# Patient Record
Sex: MALE | Race: WHITE
[De-identification: names, ages, dates, MRNs, and addresses within clinical notes are randomized per-mention and may not be internally consistent; named-entity substitution may affect disease eponyms.]

---

## 2020-07-25 ENCOUNTER — HOSPITAL ENCOUNTER (INPATIENT)
Dept: HOSPITAL 74 - JER | Age: 60
LOS: 1 days | Discharge: ANOTHER HOSPITAL | DRG: 87 | End: 2020-07-26
Attending: INTERNAL MEDICINE | Admitting: INTERNAL MEDICINE
Payer: COMMERCIAL

## 2020-07-25 VITALS — BODY MASS INDEX: 29.2 KG/M2
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
## 2020-07-25 DIAGNOSIS — S06.5X0A: Primary | ICD-10-CM

## 2020-07-25 DIAGNOSIS — E78.5: ICD-10-CM

## 2020-07-25 DIAGNOSIS — I25.2: ICD-10-CM
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
## 2020-07-25 DIAGNOSIS — I10: ICD-10-CM

## 2020-07-25 DIAGNOSIS — I25.10: ICD-10-CM

## 2020-07-25 LAB
ALBUMIN SERPL-MCNC: 4 G/DL (ref 3.4–5)
ALP SERPL-CCNC: 119 U/L (ref 45–117)
ALT SERPL-CCNC: 39 U/L (ref 13–61)
ANION GAP SERPL CALC-SCNC: 7 MMOL/L (ref 8–16)
APTT BLD: 31.9 SECONDS (ref 25.2–36.5)
AST SERPL-CCNC: 41 U/L (ref 15–37)
BASOPHILS # BLD: 0.5 % (ref 0–2)
BILIRUB SERPL-MCNC: 0.5 MG/DL (ref 0.2–1)
BUN SERPL-MCNC: 9.1 MG/DL (ref 7–18)
CALCIUM SERPL-MCNC: 9.1 MG/DL (ref 8.5–10.1)
CHLORIDE SERPL-SCNC: 101 MMOL/L (ref 98–107)
CO2 SERPL-SCNC: 25 MMOL/L (ref 21–32)
CREAT SERPL-MCNC: 0.7 MG/DL (ref 0.55–1.3)
DEPRECATED RDW RBC AUTO: 13.7 % (ref 11.9–15.9)
EOSINOPHIL # BLD: 1.2 % (ref 0–4.5)
GLUCOSE SERPL-MCNC: 105 MG/DL (ref 74–106)
HCT VFR BLD CALC: 43.7 % (ref 35.4–49)
HGB BLD-MCNC: 14.8 GM/DL (ref 11.7–16.9)
INR BLD: 0.97 (ref 0.83–1.09)
LYMPHOCYTES # BLD: 17.2 % (ref 8–40)
MCH RBC QN AUTO: 34 PG (ref 25.7–33.7)
MCHC RBC AUTO-ENTMCNC: 33.9 G/DL (ref 32–35.9)
MCV RBC: 100.3 FL (ref 80–96)
MONOCYTES # BLD AUTO: 8.2 % (ref 3.8–10.2)
NEUTROPHILS # BLD: 72.9 % (ref 42.8–82.8)
PLATELET # BLD AUTO: 211 K/MM3 (ref 134–434)
PMV BLD: 9.4 FL (ref 7.5–11.1)
POTASSIUM SERPLBLD-SCNC: 4.9 MMOL/L (ref 3.5–5.1)
PROT SERPL-MCNC: 7.7 G/DL (ref 6.4–8.2)
PT PNL PPP: 11.5 SEC (ref 9.7–13)
RBC # BLD AUTO: 4.35 M/MM3 (ref 4–5.6)
SODIUM SERPL-SCNC: 133 MMOL/L (ref 136–145)
WBC # BLD AUTO: 12.2 K/MM3 (ref 4–10)

## 2020-07-25 PROCEDURE — U0003 INFECTIOUS AGENT DETECTION BY NUCLEIC ACID (DNA OR RNA); SEVERE ACUTE RESPIRATORY SYNDROME CORONAVIRUS 2 (SARS-COV-2) (CORONAVIRUS DISEASE [COVID-19]), AMPLIFIED PROBE TECHNIQUE, MAKING USE OF HIGH THROUGHPUT TECHNOLOGIES AS DESCRIBED BY CMS-2020-01-R: HCPCS

## 2020-07-25 NOTE — PDOC
*Physical Exam





- Vital Signs


                                Last Vital Signs











Temp Pulse Resp BP Pulse Ox


 


 98.8 F   87   18   136/88   98 


 


 07/25/20 16:56  07/25/20 16:56  07/25/20 16:56  07/25/20 16:56  07/25/20 16:56














ED Treatment Course





- LABORATORY


CBC & Chemistry Diagram: 


                                 07/25/20 18:00





                                 07/25/20 18:00





- ADDITIONAL ORDERS


Additional order review: 


                               Laboratory  Results











  07/25/20 07/25/20





  18:00 18:00


 


PT with INR  11.50 


 


INR  0.97 


 


PTT (Actin FS)  31.9 


 


Sodium   133 L


 


Potassium   4.9


 


Chloride   101


 


Carbon Dioxide   25


 


Anion Gap   7 L


 


BUN   9.1


 


Creatinine   0.7


 


Est GFR (CKD-EPI)AfAm   119.72


 


Est GFR (CKD-EPI)NonAf   103.30


 


Random Glucose   105


 


Calcium   9.1


 


Total Bilirubin   0.5


 


AST   41 H


 


ALT   39


 


Alkaline Phosphatase   119 H


 


Total Protein   7.7


 


Albumin   4.0








                                        











  07/25/20





  18:00


 


RBC  4.35


 


MCV  100.3 H


 


MCHC  33.9


 


RDW  13.7


 


MPV  9.4


 


Neutrophils %  72.9


 


Lymphocytes %  17.2


 


Monocytes %  8.2


 


Eosinophils %  1.2


 


Basophils %  0.5














- Medications


Given in the ED: 


ED Medications














Discontinued Medications














Generic Name Dose Route Start Last Admin





  Trade Name Troyq  PRN Reason Stop Dose Admin


 


Diphenhydramine HCl  25 mg  07/25/20 16:57  07/25/20 18:03





  Benadryl Injection -  IVPUSH  07/25/20 16:58  Not Given





  ONCE ONE  


 


Metoclopramide HCl  10 mg  07/25/20 16:57  07/25/20 18:03





  Reglan Injection -  IVPB  07/25/20 16:58  Not Given





  ONCE ONE  


 


Sodium Chloride  1,000 ml  07/25/20 16:57  07/25/20 18:03





  Normal Saline -  IV  07/25/20 16:58  1,000 ml





  ONCE ONE   Administration














Medical Decision Making





- Medical Decision Making


Pt was signed out to me by resident Dr. Hook, who explained the 

presentation, ED course, any pending results, and needed interventions. Pt 

pending transfer to [place] (day team spoke with Dr. Mendez, Neuro ICU 

fellow). VSS and neuro exam unchanged from baseline.





Pt receiving tylenol, reglan, benadryl for headache.


Neuro ICU at [place] requesting repeat head CT for stability of bleed. Pt 

agreeable to plan. 


07/25/20 19:25





Pt taken for repeat head CT. States headache improved after interventions. VSS. 


07/25/20 21:57





CT unchanged from prior. 


Spoke with Dr. Tomlin (attending Dr. Laureano), accepted to neuro step-down 

unit. 


Pt pending transfer.


BP 150s/80s, will medicate with IV labetalol push doses.


07/25/20 22:32





No step-down beds available, per transfer center. Will update us in the morning.




Paged hospitalist team for admission. 


07/25/20 23:00





Pt accepted to hospitalist team.


Pt stable, comfortable. 


07/25/20 23:45








Discharge





- Discharge Information


Problems reviewed: Yes


Clinical Impression/Diagnosis: 


 Subdural hematoma





Headache


Qualifiers:


 Headache type: unspecified Headache chronicity pattern: acute headache 

Intractability: not intractable Qualified Code(s): R51 - Headache





Condition: Guarded





- Admission


Yes





- Follow up/Referral


Referrals: 


Marcello Burgos [Primary Care Provider] - 





- Patient Discharge Instructions





- Post Discharge Activity

## 2020-07-25 NOTE — PDOC
Attending Attestation





- Resident


Resident Name: DanyellVladiimr chavez





- ED Attending Attestation


I have performed the following: I have examined & evaluated the patient, The 

case was reviewed & discussed with the resident, I agree w/resident's findings &

plan, Exceptions are as noted





- HPI


HPI: 





07/25/20 18:37


61 yo male h/o HTN HLD CAD , x/p stents. here with headache x 7 day. described 

as retro orbital behind left eye.  constant. no associated n/v . no focal 

weakness. no change to speech, or vision changes.


did have a minor head trauma 10 days ago, when slipped and hit his head on 

closet. no loc at the time. no other complaints. did not take anything for pain 

today. take asa and plavix daily, took today.





- Physicial Exam


PE: 





07/25/20 18:37


awake alert lungs clear bilat heart rrr no mrg abd soft nt nd ext wwp. nuero CN 

II - XII intact, strength 5/5 all four ext. skin warm and dry . alert oriented x

3. speech clear. pupils reactive , right pupil is irregular ( chronic ) 

anisocoria.





- Medical Decision Making





07/25/20 18:41


61 yo male h/o cad htn hld on asa plavix here with headache.


differential ich, traumatic injury, migraine, electrolyte abnormality, anemia. 

plan ct head labs, pain medication.


pt initially seen by PRAVEEN DAWSON , ct head ordered.





ct head with subdural hematoma superifical to the left tentorium, medial to the 

anterior hippocampus, and left occipital lobe, and seen extending superiorly 

over the posterior interhemispheric falx above the splenium of the corpus 

callosum


d/w nuersurgery on call Dr Adams, who agreeable for transfer out of system.





[facility] transfer initiated. 





07/25/20 18:44





07/25/20 19:01


pt refusing transfer to [facility], requesting transfer to [facility] 


07/25/20 19:03


[place] transfer [facility] called. d/w nuerosurgery ICU fellow. 


who accepted by dr Morrell, , fax sheet sent to 


07/25/20 19:13








Discharge





- Discharge Information


Problems reviewed: Yes


Clinical Impression/Diagnosis: 


 Subdural hematoma





Headache


Qualifiers:


 Headache type: unspecified Headache chronicity pattern: acute headache 

Intractability: not intractable Qualified Code(s): R51 - Headache





Condition: Guarded


Disposition: TRANSFER ACUTE CARE/OTHER HOSP





- Admission


No





- Follow up/Referral


Referrals: 


Marcello Burgos [Primary Care Provider] - 





- Patient Discharge Instructions





- Post Discharge Activity





- Transfer to Acute Care Facility


Receiving Facility Name: [facility]

## 2020-07-25 NOTE — PDOC
History of Present Illness





- General


Chief Complaint: Headache


Stated Complaint: HEADACHE


Time Seen by Provider: 07/25/20 16:53





- History of Present Illness


Initial Comments: 





The pt is a 60M w/ a history of MI (s/p stent 2006, Plavix/ASA), HTN, HLD who 

presents for evaluation of 1 week of HA. He reports falling and hitting his head

10 days ago w/o LOC but otherwise no other incidents. His HA started 7 days ago,

if L frontal, radiates diffusely, is constant but waxes and wanes, and is not 

exacerbated or alleviated by anything he can identify.


Pt has tried Advil with minimal relief. 


Pt denies vision changes, N/V, chest pain, trouble breathing, changes in 

sensation/strength, LOC, dizziness





PMH: HTN, HLD, MI (s/p stent)


PSH: Multiple b/l eye surgeries


Meds: ASA, Plavix, HTN meds, statin


Allergies: Denies


SH: Smokes 1ppd, Social EtOH, Denies illicit drug use


07/25/20 18:22








Past History





- Medical History


Allergies/Adverse Reactions: 


                                    Allergies











Allergy/AdvReac Type Severity Reaction Status Date / Time


 


No Known Allergies Allergy   Verified 07/25/20 16:56











Home Medications: 


Ambulatory Orders





Carvedilol Phosphate [Carvedilol ER] 10 mg PO DAILY 07/25/20 


Clopidogrel Bisulfate [Plavix] 75 mg PO DAILY 07/25/20 


Lisinopril [Prinivil] 10 mg PO DAILY 07/25/20 


Rosuvastatin Calcium [Crestor] 10 mg PO HS 07/25/20 








Cardiac Disorders: Yes (CAD STENT AGE 47 LAD)


COPD: No


HTN: Yes





- Psycho-Social/Smoking History


Smoking History: Current every day smoker


Number of Cigarettes Smoked Daily: 20


Information on smoking cessation initiated: Yes





- Substance Abuse Hx (Audit-C & DAST Scrn)


How often the patient has a drink containing alcohol: 2-4 times / month


Score: In Men: 4 or > Positive; In Women: 3 or > Positive: 2


Screen Result (Pos requires Nsg. Audit-10AR): Negative


In the last yr the pt used illegal drug/Rx for NonMed reason: No


Score:  Yes response is considered Positive: 0


Screen Result (Positive result requires Nsg. DAST-10): Negative





**Review of Systems





- Review of Systems


Able to Perform ROS?: Yes


Comments:: 





GENERAL/CONSTITUTIONAL: No fever or chills. No weakness


HEAD, EYES, EARS, NOSE AND THROAT: No change in vision. No change in hearing. No

 sore throat


CARDIOVASCULAR: No chest pain or shortness of breath


RESPIRATORY: Denies cough, hemoptysis


GASTROINTESTINAL: No nausea, vomiting, diarrhea or constipation


GENITOURINARY: No dysuria, frequency, or change in urination


MUSCULOSKELETAL: No joint or muscle swelling or pain. No neck or back pain


SKIN: No rash


NEUROLOGIC: No vertigo, loss of consciousness, or change in strength/sensation


ENDOCRINE: No increased thirst. No abnormal weight change


HEMATOLOGIC/LYMPHATIC: No anemia, easy bleeding, or history of blood clots


ALLERGIC/IMMUNOLOGIC: No hives or skin allergy





07/25/20 18:30





Is the patient limited English proficient: No





*Physical Exam





- Vital Signs


                                Last Vital Signs











Temp Pulse Resp BP Pulse Ox


 


 98.8 F   87   18   136/88   98 


 


 07/25/20 16:56  07/25/20 16:56  07/25/20 16:56  07/25/20 16:56  07/25/20 16:56














- Physical Exam





GENERAL: Awake, alert, and oriented to person/place/time, in no acute distress


HEAD: No signs of trauma, normocephalic, atraumatic


EYES: Anisocoria (reported chronic 2/2 previous surgeries), pupils reactive, 

EOMI, sclera anicteric, conjunctiva clear


ENT: Hearing grossly normal, nares patent, oropharynx clear without exudates. No

 uvular deviation. Moist mucosa


LUNGS: No distress, speaks in full sentences, clear to auscultation bilaterally


HEART: Regular rate and rhythm, normal S1 and S2, no murmurs appreciated, 

peripheral pulses normal and equal bilaterally


ABDOMEN: Soft, nontender, normoactive bowel sounds. No guarding, no rebound


EXTREMITIES: Normal inspection, Normal range of motion, no edema. No clubbing or

 cyanosis


NEUROLOGICAL: Cranial nerves II through XII grossly intact. Normal speech, 

normal gait, no focal sensorimotor deficits


SKIN: Warm, Dry





07/25/20 18:30








ED Treatment Course





- LABORATORY


CBC & Chemistry Diagram: 


                                 07/25/20 18:00





                                 07/25/20 18:00





- Medications


Given in the ED: 


ED Medications














Discontinued Medications














Generic Name Dose Route Start Last Admin





  Trade Name Freq  PRN Reason Stop Dose Admin


 


Diphenhydramine HCl  25 mg  07/25/20 16:57  07/25/20 18:03





  Benadryl Injection -  IVPUSH  07/25/20 16:58  Not Given





  ONCE ONE  


 


Metoclopramide HCl  10 mg  07/25/20 16:57  07/25/20 18:03





  Reglan Injection -  IVPB  07/25/20 16:58  Not Given





  ONCE ONE  


 


Sodium Chloride  1,000 ml  07/25/20 16:57  07/25/20 18:03





  Normal Saline -  IV  07/25/20 16:58  1,000 ml





  ONCE ONE   Administration














Medical Decision Making





- Medical Decision Making





The pt is a 60M w/ a history of MI (s/p stent 2006, Plavix/ASA), HTN, HLD who 

presents for evaluation of 1 week of HA w/o associated neurol





Labs, meds, and imaging ordered from [facility]/Pt initially worked up in [facility] [place]


-Benadryl, Reglan, IVF given





CT head w/ acute subdural hematoma superifical to the left tentorium, medial to 

the anterior hippocampus, and left occipital lobe, and seen extending superiorly

 over the posterior interhemispheric falx above the splenium of the corpus 

callosum





Case discussed w/ Dr. Adams, agrees with transfer to [facility] for neurocritical 

care evaluation





COVID swab sent


WBC 12.2


No anemia


Lytes overall unremarkable


No KIMBERLY


LFTs overall unremarkable


[facility]





Transfer center contacted, [place] [facility]





ECG w/ NSR; HR 77; QTc 409; no axis deviation; no acute ischemic changes





At time of signout, pt still A&Ox3 w/o any neurologic deficits


Pt refusing transfer to [facility] and states he has an accepting physician at 

[place] and is obtaining the name/specialty at this time


07/25/20 18:51





Pt accepted to [place] by Dr. Morrell ([facility]


07/25/20 18:59








Discharge





- Discharge Information


Problems reviewed: Yes


Clinical Impression/Diagnosis: 


 Subdural hematoma





Headache


Qualifiers:


 Headache type: unspecified Headache chronicity pattern: acute headache 

Intractability: not intractable Qualified Code(s): R51 - Headache





Condition: Guarded


Disposition: TRANSFER ACUTE CARE/OTHER HOSP





- Admission


No





- Follow up/Referral


Referrals: 


Marcello Burgos [Primary Care Provider] - 





- Patient Discharge Instructions





- Post Discharge Activity





- Transfer to Acute Care Facility


[facility]: [facility]


Accepting Physician:: Dr. Morrell

## 2020-07-25 NOTE — PDOC
*Physical Exam





- Vital Signs


                                Last Vital Signs











Temp Pulse Resp BP Pulse Ox


 


 98.8 F   87   18   136/88   98 


 


 07/25/20 16:56  07/25/20 16:56  07/25/20 16:56  07/25/20 16:56  07/25/20 16:56














- Physical Exam





07/25/20 18:26


awake alert lungs clear bilat heart rrr no mrg abd sot nt nd.  ext wwp. nuero 

GCS 15. 5/5 all four ext.  CN II - XII intact.  speech clear alert oriented x 3.







ED Treatment Course





- LABORATORY


CBC & Chemistry Diagram: 


                                 07/25/20 18:00





                                 07/25/20 18:00





- ADDITIONAL ORDERS


Additional order review: 


                                        











  07/25/20





  18:00


 


RBC  4.35


 


MCV  100.3 H


 


MCHC  33.9


 


RDW  13.7


 


MPV  9.4


 


Neutrophils %  72.9


 


Lymphocytes %  17.2


 


Monocytes %  8.2


 


Eosinophils %  1.2


 


Basophils %  0.5














- Medications


Given in the ED: 


ED Medications














Discontinued Medications














Generic Name Dose Route Start Last Admin





  Trade Name Nika  PRN Reason Stop Dose Admin


 


Diphenhydramine HCl  25 mg  07/25/20 16:57  07/25/20 18:03





  Benadryl Injection -  IVPUSH  07/25/20 16:58  Not Given





  ONCE ONE  


 


Metoclopramide HCl  10 mg  07/25/20 16:57  07/25/20 18:03





  Reglan Injection -  IVPB  07/25/20 16:58  Not Given





  ONCE ONE  


 


Sodium Chloride  1,000 ml  07/25/20 16:57  07/25/20 18:03





  Normal Saline -  IV  07/25/20 16:58  1,000 ml





  ONCE ONE   Administration














Medical Decision Making





- Medical Decision Making





07/25/20 18:27


59 yo male h/o HTN HLD CAD , x/p stents. here with headache x 7 day. described 

as retro orbital behind left eye.  constant. no associated n/v . no focal 

weakness. no change to speech, or vision changes.


did have a minor head trauma 10 days ago, when slipped and hit his head on 

closet. no loc at the time. no other complaints. did not take anything for pain 

today. take asa and plavix daily, took today.





PCP Aubrey 





Discharge





- Discharge Information


Clinical Impression/Diagnosis: 


 Headache








- Follow up/Referral


Referrals: 


Marcello Burgos [Primary Care Provider] - 





- Patient Discharge Instructions





- Post Discharge Activity

## 2020-07-25 NOTE — PDOC
Rapid Medical Evaluation


Time Seen by Provider: 07/25/20 16:53


Medical Evaluation: 





07/25/20 16:53





I performed a brief in-person evaluation of this patient.


Pt is a 59 y/o male with h/o MI LAD with stent, HTN, HLD who presents with a 

left sided HA over the eye for the last 1 week.  The HA is now spreading to the 

R side.  Tried Advil without much relief, took Claritin without relief. Ice 

packs help. 





Pertinent physical exam findings: No tenderness with tapping over the L temporal

region. Speaking in full sentences. 








I have ordered the following: saline lock, reglan, benadryl, ct head; will defer

lab orders to treating provider








Patient to proceed to ED for further evaluation.











**Discharge Disposition





- Diagnosis


 Headache








- Referrals





- Patient Instructions





- Post Discharge Activity

## 2020-07-26 VITALS — SYSTOLIC BLOOD PRESSURE: 139 MMHG | DIASTOLIC BLOOD PRESSURE: 82 MMHG | HEART RATE: 64 BPM

## 2020-07-26 VITALS — TEMPERATURE: 98.1 F

## 2020-07-26 LAB
ALBUMIN SERPL-MCNC: 3.7 G/DL (ref 3.4–5)
ALP SERPL-CCNC: 115 U/L (ref 45–117)
ALT SERPL-CCNC: 33 U/L (ref 13–61)
ANION GAP SERPL CALC-SCNC: 7 MMOL/L (ref 8–16)
APTT BLD: 31.7 SECONDS (ref 25.2–36.5)
AST SERPL-CCNC: 18 U/L (ref 15–37)
BASOPHILS # BLD: 0.3 % (ref 0–2)
BILIRUB SERPL-MCNC: 0.4 MG/DL (ref 0.2–1)
BUN SERPL-MCNC: 9.2 MG/DL (ref 7–18)
CALCIUM SERPL-MCNC: 8.4 MG/DL (ref 8.5–10.1)
CHLORIDE SERPL-SCNC: 105 MMOL/L (ref 98–107)
CO2 SERPL-SCNC: 24 MMOL/L (ref 21–32)
CREAT SERPL-MCNC: 0.8 MG/DL (ref 0.55–1.3)
DEPRECATED RDW RBC AUTO: 13.5 % (ref 11.9–15.9)
EOSINOPHIL # BLD: 1.4 % (ref 0–4.5)
GLUCOSE SERPL-MCNC: 110 MG/DL (ref 74–106)
HCT VFR BLD CALC: 40.9 % (ref 35.4–49)
HGB BLD-MCNC: 14.1 GM/DL (ref 11.7–16.9)
INR BLD: 1 (ref 0.83–1.09)
LYMPHOCYTES # BLD: 17.1 % (ref 8–40)
MAGNESIUM SERPL-MCNC: 2.3 MG/DL (ref 1.8–2.4)
MCH RBC QN AUTO: 34.2 PG (ref 25.7–33.7)
MCHC RBC AUTO-ENTMCNC: 34.4 G/DL (ref 32–35.9)
MCV RBC: 99.6 FL (ref 80–96)
MONOCYTES # BLD AUTO: 9.6 % (ref 3.8–10.2)
NEUTROPHILS # BLD: 71.6 % (ref 42.8–82.8)
PHOSPHATE SERPL-MCNC: 3.5 MG/DL (ref 2.5–4.9)
PLATELET # BLD AUTO: 204 K/MM3 (ref 134–434)
PMV BLD: 9.4 FL (ref 7.5–11.1)
POTASSIUM SERPLBLD-SCNC: 4.2 MMOL/L (ref 3.5–5.1)
PROT SERPL-MCNC: 7 G/DL (ref 6.4–8.2)
PT PNL PPP: 11.8 SEC (ref 9.7–13)
RBC # BLD AUTO: 4.1 M/MM3 (ref 4–5.6)
SODIUM SERPL-SCNC: 135 MMOL/L (ref 136–145)
WBC # BLD AUTO: 10.7 K/MM3 (ref 4–10)

## 2020-07-26 NOTE — DS
Physical Exam: 


SUBJECTIVE: 


Patient seen and examined at bedside.  The patient denied any blurry vision, 

numbness, weakness, fever/chills, diarrhea, constipation, or nausea.








OBJECTIVE:





                                   Vital Signs











 Period  Temp  Pulse  Resp  BP Sys/Mcguire  Pulse Ox


 


 Last 24 Hr  98.1 F-99.1 F  62-87  16-20  122-159/62-98  94-99








PHYSICAL EXAM





GENERAL: The patient is awake, alert, and fully oriented, in no acute distress.


HEAD: Normal with no signs of trauma.


EYES: PERRL, extraocular movements intact. No ptosis. 


ENT: Ears normal, nares patent, oropharynx clear without exudates, moist mucous 

membranes.


NECK: Trachea midline, full range of motion, supple. 


LUNGS: Breath sounds equal, clear to auscultation bilaterally, no wheezes, no 

crackles, no accessory muscle use. 


HEART: Regular rate and rhythm, S1, S2 without murmur, rub or gallop.


ABDOMEN: Soft, nontender, nondistended, normoactive bowel sounds, no guarding, 

no rebound, no masses.


EXTREMITIES: 2+ pulses, warm, well-perfused, no edema. 


NEUROLOGICAL: Cranial nerves II through XII grossly intact. Normal speech, gait 

not observed.  Muscle strength +5/5 bilaterally in upper and lower extremities. 

Sensation intact bilaterally in upper and lower extremities.  DTR's +2/4 

bilaterally in upper and lower extremities.


PSYCH: Normal mood, normal affect.


SKIN: Warm, dry, normal turgor, no rashes or lesions noted





LABS


                         Laboratory Results - last 24 hr











  07/25/20 07/25/20 07/25/20





  18:00 18:00 18:00


 


WBC  12.2 H  


 


RBC  4.35  


 


Hgb  14.8  


 


Hct  43.7  


 


MCV  100.3 H  


 


MCH  34.0 H  


 


MCHC  33.9  


 


RDW  13.7  


 


Plt Count  211  


 


MPV  9.4  


 


Absolute Neuts (auto)  8.9 H  


 


Neutrophils %  72.9  


 


Lymphocytes %  17.2  


 


Monocytes %  8.2  


 


Eosinophils %  1.2  


 


Basophils %  0.5  


 


Nucleated RBC %  0  


 


PT with INR    11.50


 


INR    0.97


 


PTT (Actin FS)    31.9


 


Sodium   133 L 


 


Potassium   4.9 


 


Chloride   101 


 


Carbon Dioxide   25 


 


Anion Gap   7 L 


 


BUN   9.1 


 


Creatinine   0.7 


 


Est GFR (CKD-EPI)AfAm   119.72 


 


Est GFR (CKD-EPI)NonAf   103.30 


 


Random Glucose   105 


 


Calcium   9.1 


 


Phosphorus   


 


Magnesium   


 


Total Bilirubin   0.5 


 


AST   41 H 


 


ALT   39 


 


Alkaline Phosphatase   119 H 


 


Creatine Kinase   


 


Troponin I   


 


Total Protein   7.7 


 


Albumin   4.0 














  07/26/20 07/26/20 07/26/20





  06:48 06:48 06:48


 


WBC  10.7 H  


 


RBC  4.10  


 


Hgb  14.1  


 


Hct  40.9  


 


MCV  99.6 H  


 


MCH  34.2 H  


 


MCHC  34.4  


 


RDW  13.5  


 


Plt Count  204  


 


MPV  9.4  


 


Absolute Neuts (auto)  7.6  


 


Neutrophils %  71.6  


 


Lymphocytes %  17.1  


 


Monocytes %  9.6  


 


Eosinophils %  1.4  


 


Basophils %  0.3  


 


Nucleated RBC %  0  


 


PT with INR   11.80 


 


INR   1.00 


 


PTT (Actin FS)   31.7 


 


Sodium    135 L


 


Potassium    4.2


 


Chloride    105


 


Carbon Dioxide    24


 


Anion Gap    7 L


 


BUN    9.2


 


Creatinine    0.8


 


Est GFR (CKD-EPI)AfAm    113.33


 


Est GFR (CKD-EPI)NonAf    97.78


 


Random Glucose    110 H


 


Calcium    8.4 L


 


Phosphorus    3.5


 


Magnesium    2.3


 


Total Bilirubin    0.4


 


AST    18


 


ALT    33


 


Alkaline Phosphatase    115


 


Creatine Kinase    54


 


Troponin I    < 0.02


 


Total Protein    7.0


 


Albumin    3.7











HOSPITAL COURSE:





Date of Admission:07/25/20





60 year old male patient with past medical history that includes CAD, HTN, and 

HLD, who presented to the ED with a headache for 7 days after hitting his head 

on a closet 10 days ago.  CT Head found a subdural hematoma.  The neurosurgeon 

recommend no neurosurgical intervention; however, the patient preferred transfer

to [place] for further neurosurgical evaluation/intervention.  The neurosurgeon

also did not recommend Keppra, as it was 10 days since the initial injury.  When

the patient was told that the neurosurgeon feels that he may not need any 

treatment for his subdural hematoma, the patient reported that he would still 

like to be transferred to [place].  On transfer, the patient was instructed to 

not take any aspirin or plavix for at least 1 week to prevent any further 

bleeding in his brain, and to talk to his doctor about resuming aspirin or 

plavix as he may need a repeat CT Head before he could resume taking aspirin or 

plavix.  The patient was also instructed about smoking cessation.





Date of Discharge: 07/26/20





Minutes to complete discharge: 40





Discharge Summary


Problems reviewed: Yes


Reason For Visit: SUBDURAL HEMATOMA


Current Active Problems





Headache (Acute)


Subdural hematoma (Acute)








Condition: Guarded





- Instructions


Diet, Activity, Other Instructions: 


You were admitted to the hospital due to hitting your head and due to a 

headache.  While you were at the hospital, you were evaluated with blood work, 

lab work, and imaging.  Based on our evaluation, you had blood next to your 

brain called a "subdural hematoma".  As per requested, you were set up for 

transfer to [place] for further evaluation and intervention.





Recommendation:


Please do not taken any Aspirin and Plavix for at least 1 week to prevent any 

further bleeding in your brain.  


Our neurosurgeon did not recommend Keppra at this time, as you are 10 days since

your initial injury.


Please talk to your doctor before resuming any Aspirin or Plavix, as you may 

need another CAT scan of your head again to ensure that it is safe to resume 

taking Aspirin and Plavix.


Please do not smoke as this can damage the blood vessels of the brain.





Imaging Findings:


A CAT scan of your head showed blood next to your brain on the left side called 

a "subdural hematoma".





Medications:


Please take all of your medications as prescribed.





Follow ups:


Please follow up with the primary care physician Dr. Marcello Burgos.


If you experience worsening symptoms, headache, shortness of breath, chest pain,

or abdominal pain, please return to the emergency room or call 911.





You have been accepted to [place] for further neurosurgical 

evaluation/intervention, and you will be transferred.





Referrals: 


Marcello Burgos [Primary Care Provider] - 


Disposition: TRANSFER ACUTE CARE/OTHER HOSP





- Home Medications


Comprehensive Discharge Medication List: 


Ambulatory Orders





Carvedilol Phosphate [Carvedilol ER] 10 mg PO DAILY 07/25/20 


Lisinopril [Prinivil] 10 mg PO DAILY 07/25/20 


Rosuvastatin Calcium [Crestor] 10 mg PO HS 07/25/20 








This patient is new to me today: Yes


Date on this admission: 07/25/20


Emergency Visit: Yes


ED Registration Date: 07/25/20


Care time: The patient presented to the Emergency Department on the above date 

and was hospitalized for further evaluation of their emergent condition.


Critical Care patient: No





- Discharge Referral


Referred to [facility] No





ATTENDING PHYSICIAN STATEMENT





I saw and evaluated the patient.


I reviewed the resident's note and discussed the case with the resident.


I agree with the resident's findings and plan as documented.








SUBJECTIVE:








OBJECTIVE:








ASSESSMENT AND PLAN:

## 2020-07-26 NOTE — EKG
Test Reason : 

Blood Pressure : ***/*** mmHG

Vent. Rate : 077 BPM     Atrial Rate : 077 BPM

   P-R Int : 154 ms          QRS Dur : 090 ms

    QT Int : 362 ms       P-R-T Axes : 051 030 046 degrees

   QTc Int : 409 ms

 

NORMAL SINUS RHYTHM

POSSIBLE ANTERIOR INFARCT , AGE UNDETERMINED

ABNORMAL ECG

NO PREVIOUS ECGS AVAILABLE

Confirmed by MD Worthy Edward ([id number]) on 7/26/2020 5:25:36 PM

 

Referred By:             Confirmed By:Nilesh Wrothy MD

## 2020-07-26 NOTE — PN
Teaching Attending Note


Name of Resident: Elvin Sharpe





ATTENDING PHYSICIAN STATEMENT





I saw and evaluated the patient.


I reviewed the resident's note and discussed the case with the resident.


I agree with the resident's findings and plan as documented.








SUBJECTIVE:








OBJECTIVE:








ASSESSMENT AND PLAN:


Date of Service: July 25th 2020


59 yo male h/o HTN HLD CAD , x/p stents. here with c/o headache x 7 days. 


Patient did have a minor head trauma 10 days ago, when slipped and hit his head 

on closet.  Takes asa and plavix daily. 


CT head with subdural hematoma superifical to the left tentorium, medial to the 

anterior hippocampus, and left occipital lobe, and seen extending superiorly 

over the posterior interhemispheric falx above the splenium of the corpus 

callosum


ED D/w neurosurgery on call Dr Adams, who agreeable for transfer out of system.  

[facility] transfer initiated.

## 2020-07-26 NOTE — PN
Teaching Attending Note


Name of Resident: Michael Arambula





ATTENDING PHYSICIAN STATEMENT





I saw and evaluated the patient.


I reviewed the resident's note and discussed the case with the resident.


I agree with the resident's findings and plan as documented.








SUBJECTIVE:


Seen and examined at bedside.  Patient is alert and oriented x3, cranial nerves 

II through XII intact, no focal deficits.  Is hemodynamically stable pending 

transfer to [facility] for treatment of subdural hematoma.





OBJECTIVE:


                                Last Vital Signs











Temp Pulse Resp BP Pulse Ox


 


 98.3 F   72   17   123/67   94 L


 


 07/26/20 09:06  07/26/20 09:06  07/26/20 09:06  07/26/20 09:06  07/26/20 09:06





PE: Per resident note


Labs/Imaging: reviewed





ASSESSMENT AND PLAN:


60-year-old male past medical history of hypertension, hyperlipidemia, MI status

post stents who presents with headaches after slipping and falling in his 

closet.  Patient found to have subdural hematoma.  Pending transfer to [facility]





#Subdural hematoma


Neurology on board: Appreciate recommendations.  Per neurosurgery no 

neurosurgical intervention indicated.  Will confirm as to whether transfer to 

[facility] is required.


Transfer to [facility] pending


Hold ASA/Plavix for 1 week





#History of CAD with stents


Hold aspirin and Plavix for 1 week





#Hypertension


Continue home medications





#Hyperlipidemia


Continue home statin

## 2020-07-26 NOTE — PN
Physical Exam: 


SUBJECTIVE: 


Patient seen and examined at bedside.  The patient denied any blurry vision, 

numbness, weakness, fever/chills, diarrhea, constipation, or nausea.  When told 

that the neurosurgeon feels that the patient may not need any treatment for his 

subdural hematoma, the patient reported that he would still like to be 

transferred to [facility].








OBJECTIVE:





                                   Vital Signs











 Period  Temp  Pulse  Resp  BP Sys/Mcguire  Pulse Ox


 


 Last 24 Hr  98.3 F-99.1 F  62-87  16-20  122-159/62-98  94-99











GENERAL: The patient is awake, alert, and fully oriented, in no acute distress.


HEAD: Normal with no signs of trauma.


EYES: PERRL, extraocular movements intact. No ptosis. 


ENT: Ears normal, nares patent, oropharynx clear without exudates, moist mucous 

membranes.


NECK: Trachea midline, full range of motion, supple. 


LUNGS: Breath sounds equal, clear to auscultation bilaterally, no wheezes, no 

crackles, no accessory muscle use. 


HEART: Regular rate and rhythm, S1, S2 without murmur, rub or gallop.


ABDOMEN: Soft, nontender, nondistended, normoactive bowel sounds, no guarding, 

no rebound, no masses.


EXTREMITIES: 2+ pulses, warm, well-perfused, no edema. 


NEUROLOGICAL: Cranial nerves II through XII grossly intact. Normal speech, gait 

not observed.  Muscle strength +5/5 bilaterally in upper and lower extremities. 

Sensation intact bilaterally in upper and lower extremities.  DTR's +2/4 

bilaterally in upper and lower extremities.


PSYCH: Normal mood, normal affect.


SKIN: Warm, dry, normal turgor, no rashes or lesions noted














                         Laboratory Results - last 24 hr











  07/25/20 07/25/20 07/25/20





  18:00 18:00 18:00


 


WBC  12.2 H  


 


RBC  4.35  


 


Hgb  14.8  


 


Hct  43.7  


 


MCV  100.3 H  


 


MCH  34.0 H  


 


MCHC  33.9  


 


RDW  13.7  


 


Plt Count  211  


 


MPV  9.4  


 


Absolute Neuts (auto)  8.9 H  


 


Neutrophils %  72.9  


 


Lymphocytes %  17.2  


 


Monocytes %  8.2  


 


Eosinophils %  1.2  


 


Basophils %  0.5  


 


Nucleated RBC %  0  


 


PT with INR    11.50


 


INR    0.97


 


PTT (Actin FS)    31.9


 


Sodium   133 L 


 


Potassium   4.9 


 


Chloride   101 


 


Carbon Dioxide   25 


 


Anion Gap   7 L 


 


BUN   9.1 


 


Creatinine   0.7 


 


Est GFR (CKD-EPI)AfAm   119.72 


 


Est GFR (CKD-EPI)NonAf   103.30 


 


Random Glucose   105 


 


Calcium   9.1 


 


Phosphorus   


 


Magnesium   


 


Total Bilirubin   0.5 


 


AST   41 H 


 


ALT   39 


 


Alkaline Phosphatase   119 H 


 


Creatine Kinase   


 


Troponin I   


 


Total Protein   7.7 


 


Albumin   4.0 














  07/26/20 07/26/20 07/26/20





  06:48 06:48 06:48


 


WBC  10.7 H  


 


RBC  4.10  


 


Hgb  14.1  


 


Hct  40.9  


 


MCV  99.6 H  


 


MCH  34.2 H  


 


MCHC  34.4  


 


RDW  13.5  


 


Plt Count  204  


 


MPV  9.4  


 


Absolute Neuts (auto)  7.6  


 


Neutrophils %  71.6  


 


Lymphocytes %  17.1  


 


Monocytes %  9.6  


 


Eosinophils %  1.4  


 


Basophils %  0.3  


 


Nucleated RBC %  0  


 


PT with INR   11.80 


 


INR   1.00 


 


PTT (Actin FS)   31.7 


 


Sodium    135 L


 


Potassium    4.2


 


Chloride    105


 


Carbon Dioxide    24


 


Anion Gap    7 L


 


BUN    9.2


 


Creatinine    0.8


 


Est GFR (CKD-EPI)AfAm    113.33


 


Est GFR (CKD-EPI)NonAf    97.78


 


Random Glucose    110 H


 


Calcium    8.4 L


 


Phosphorus    3.5


 


Magnesium    2.3


 


Total Bilirubin    0.4


 


AST    18


 


ALT    33


 


Alkaline Phosphatase    115


 


Creatine Kinase    54


 


Troponin I    < 0.02


 


Total Protein    7.0


 


Albumin    3.7








Active Medications











Generic Name Dose Route Start Last Admin





  Trade Name Freq  PRN Reason Stop Dose Admin


 


Carvedilol  10 mg  07/26/20 10:00  07/26/20 09:37





  Coreg Cr -  PO   10 mg





  DAILY [facility]   Administration


 


Lisinopril  10 mg  07/26/20 10:00  07/26/20 09:37





  Prinivil  PO   10 mg





  DAILY [facility]   Administration


 


Oxycodone HCl  10 mg  07/26/20 07:04  07/26/20 09:37





  Roxicodone -  PO   10 mg





  Q4H PRN   Administration





  PAIN LEVEL 6-10  


 


Rosuvastatin Calcium  10 mg  07/26/20 22:00 





  Crestor -  PO  





  [facility]  











ASSESSMENT/PLAN:


60 year old male patient with past medical history that includes CAD, HTN, and 

HLD, who presented to the ED with a headache for 7 days after hitting his head 

on a closet 10 days ago.





1. Subdural Hematoma


- CT Head found a subdural hematoma


- The patient is being transferred to [facility]





2. HTN


- The patient is taking lisinopril and carvedilol


- Latest blood pressure is 123/67





3. HLD


- The patient is taking Rosuvastatin





# FEN - Monitoring electrolytes.  Sodium controlled diet.





DVT PPx - SCD's bilaterally. Plavix held secondary to Subdural Hematoma





Dispo - Transferring to [facility] per patient's wishes








Visit type





- Emergency Visit


Emergency Visit: Yes


ED Registration Date: 07/25/20


Care time: The patient presented to the Emergency Department on the above date 

and was hospitalized for further evaluation of their emergent condition.





- New Patient


This patient is new to me today: Yes


Date on this admission: 07/26/20





- Critical Care


Critical Care patient: No





- Discharge Referral


Referred to [facility] No





ATTENDING PHYSICIAN STATEMENT





I saw and evaluated the patient.


I reviewed the resident's note and discussed the case with the resident.


I agree with the resident's findings and plan as documented.








SUBJECTIVE:








OBJECTIVE:








ASSESSMENT AND PLAN:

## 2020-07-26 NOTE — HP
CHIEF COMPLAINT: worsening headache s/p fall 





PCP:





HISTORY OF PRESENT ILLNESS:


Pt slipped on clothes in a closet and hit his head. Denied LOC. Went to work 

that day, but later worsened for a few days. The pain was L sideded frontal 

headache that waxed and wanted. The pain is worse with cough. Claritin & advil 

did not relieve the pain. However, ice packs improved the symptoms. No previous 

admissions. 





ER course was notable for:


(1) WBC 12.2, Na+ 133 


(2)CT head w/ acute subdural hematoma superifical to the left tentorium, medial 

to the anterior hippocampus, and left occipital lobe, and seen extending 

superiorly over the posterior interhemispheric falx above the splenium of the 

corpus callosum


(3) Benadryl, Reglan, IVF given


(4) EKG: NSR, possible anterior infarct, age undetermined 





Recent Travel: denies





PAST MEDICAL HISTORY:


HTN, HLD, MI in LAD (s/p stent), idiopathic "uveitis" 





PAST SURGICAL HISTORY:


3 operations for glaucoma





Social History:


Smokin ppd X ~40 yrs


-social drinker


-no drugs





Allergies





No Known Allergies Allergy (Verified 20 16:56)





Family history


none


   





HOME MEDICATIONS:


                                Home Medications











 Medication  Instructions  Recorded


 


Carvedilol Phosphate [Carvedilol 10 mg PO DAILY 20





ER]  


 


Clopidogrel Bisulfate [Plavix] 75 mg PO DAILY 20


 


Lisinopril [Prinivil] 10 mg PO DAILY 20


 


Rosuvastatin Calcium [Crestor] 10 mg PO HS 20








REVIEW OF SYSTEMS


CONSTITUTIONAL: 


Absent:  fever, chills, diaphoresis, generalized weakness, malaise, loss of 

appetite, weight change


HEENT: 


Absent:  rhinorrhea, nasal congestion, throat pain, throat swelling, difficulty 

swallowing, mouth swelling, ear pain, eye pain, visual changes


CARDIOVASCULAR: 


Absent: chest pain, syncope, palpitations, irregular heart rate, 

lightheadedness, peripheral edema


RESPIRATORY: 


Absent: cough, shortness of breath, dyspnea with exertion, orthopnea, wheezing, 

stridor, hemoptysis


GASTROINTESTINAL:


Absent: abdominal pain, abdominal distension, nausea, vomiting, diarrhea, 

constipation, melena, hematochezia


GENITOURINARY: 


Absent: dysuria, frequency, urgency, hesitancy, hematuria, flank pain, genital 

pain


MUSCULOSKELETAL: 


Absent: myalgia, arthralgia, joint swelling, back pain, neck pain


SKIN: 


Absent: rash, itching, pallor


HEMATOLOGIC/IMMUNOLOGIC: 


Absent: easy bleeding, easy bruising, lymphadenopathy, frequent infections


ENDOCRINE:


Absent: unexplained weight gain, unexplained weight loss, heat intolerance, cold

 intolerance


NEUROLOGIC: headache


Absent:  focal weakness or paresthesias, dizziness, unsteady gait, seizure, 

mental status changes, bladder or bowel incontinence


PSYCHIATRIC: 


Absent: anxiety, depression, suicidal or homicidal ideation, hallucinations.








PHYSICAL EXAMINATION


                               Vital Signs - 24 hr











  20





  16:56 19:47 22:26


 


Temperature 98.8 F  


 


Pulse Rate 87  


 


Pulse Rate [  67 65





Radial]   


 


Respiratory 18 20 18





Rate   


 


Blood Pressure 136/88  


 


Blood Pressure  154/84 147/84





[Right Arm]   


 


O2 Sat by Pulse 98 99 99





Oximetry (%)   














  20





  23:56 01:15 01:23


 


Temperature  98.7 F 


 


Pulse Rate  78 


 


Pulse Rate [   





Radial]   


 


Respiratory  20 





Rate   


 


Blood Pressure  159/83 


 


Blood Pressure 139/98  





[Right Arm]   


 


O2 Sat by Pulse  99 99





Oximetry (%)   














  20





  03:00 06:00


 


Temperature  99.1 F


 


Pulse Rate 62 72


 


Pulse Rate [  





Radial]  


 


Respiratory  16





Rate  


 


Blood Pressure 122/62 142/90


 


Blood Pressure  





[Right Arm]  


 


O2 Sat by Pulse  





Oximetry (%)  











GENERAL: Awake, alert, and fully oriented, in no acute distress.


HEENT: NT, NC, head not TTP,  No lid lag. MMM


LUNGS: Breath sounds equal, clear to auscultation bilaterally. No wheezes, and 

no crackles. No accessory muscle use.


HEART: Regular rate and rhythm, normal S1 and S2 2/6 systolic murmur L sternal 

border


ABDOMEN: Soft, nontender, not distended, normoactive bowel sounds, no guarding, 

no rebound, no masses.  


MUSCULOSKELETAL: Normal range of motion at all joints. No bony deformities or 

tenderness. No CVA tenderness.


UPPER EXTREMITIES: 2+ pulses, warm, well-perfused. No cyanosis. No clubbing. No 

peripheral edema.


LOWER EXTREMITIES: 2+ pulses, warm, well-perfused. No calf tenderness. No 

peripheral edema. 


NEUROLOGICAL:  Cranial nerves II-XII intact. Normal speech. Normal gait.


PSYCHIATRIC: Cooperative. Good eye contact. Appropriate mood and affect.


SKIN: 2 macules on upper back that appear like seborrhic keratoses


                         Laboratory Results - last 24 hr











  20





  18:00 18:00 18:00


 


WBC  12.2 H  


 


RBC  4.35  


 


Hgb  14.8  


 


Hct  43.7  


 


MCV  100.3 H  


 


MCH  34.0 H  


 


MCHC  33.9  


 


RDW  13.7  


 


Plt Count  211  


 


MPV  9.4  


 


Absolute Neuts (auto)  8.9 H  


 


Neutrophils %  72.9  


 


Lymphocytes %  17.2  


 


Monocytes %  8.2  


 


Eosinophils %  1.2  


 


Basophils %  0.5  


 


Nucleated RBC %  0  


 


PT with INR    11.50


 


INR    0.97


 


PTT (Actin FS)    31.9


 


Sodium   133 L 


 


Potassium   4.9 


 


Chloride   101 


 


Carbon Dioxide   25 


 


Anion Gap   7 L 


 


BUN   9.1 


 


Creatinine   0.7 


 


Est GFR (CKD-EPI)AfAm   119.72 


 


Est GFR (CKD-EPI)NonAf   103.30 


 


Random Glucose   105 


 


Calcium   9.1 


 


Total Bilirubin   0.5 


 


AST   41 H 


 


ALT   39 


 


Alkaline Phosphatase   119 H 


 


Total Protein   7.7 


 


Albumin   4.0 














  20





  06:48


 


WBC 


 


RBC 


 


Hgb 


 


Hct 


 


MCV 


 


MCH 


 


MCHC 


 


RDW 


 


Plt Count 


 


MPV 


 


Absolute Neuts (auto) 


 


Neutrophils % 


 


Lymphocytes % 


 


Monocytes % 


 


Eosinophils % 


 


Basophils % 


 


Nucleated RBC % 


 


PT with INR  11.80


 


INR  1.00


 


PTT (Actin FS)  31.7


 


Sodium 


 


Potassium 


 


Chloride 


 


Carbon Dioxide 


 


Anion Gap 


 


BUN 


 


Creatinine 


 


Est GFR (CKD-EPI)AfAm 


 


Est GFR (CKD-EPI)NonAf 


 


Random Glucose 


 


Calcium 


 


Total Bilirubin 


 


AST 


 


ALT 


 


Alkaline Phosphatase 


 


Total Protein 


 


Albumin 











ASSESSMENT/PLAN:


61 YO M PMH HTN, HLD, MI in LAD (s/p stent), idiopathic "uveitis" p/w worsening 

headache s/p fall. Admitted for sub-dural hematoma.  





#Sub-dural Hematoma


-Pt accepted to [facility]. Waiting for bed


-will keep NPO


-hold ASA, plavix





#HTN


IV labetalol





#DVT ppx


SCDs





#FEN


no IVF


monitor lytes


NPO 





#DISPO


maintain tele


transfer to [facility] when bed is available. 








Visit type





- Emergency Visit


Emergency Visit: Yes


ED Registration Date: 20


Care time: The patient presented to the Emergency Department on the above date 

and was hospitalized for further evaluation of their emergent condition.





- New Patient


This patient is new to me today: Yes


Date on this admission: 20





- Critical Care


Critical Care patient: No





ATTENDING PHYSICIAN STATEMENT





I saw and evaluated the patient.


I reviewed the resident's note and discussed the case with the resident.


I agree with the resident's findings and plan as documented.








SUBJECTIVE:








OBJECTIVE:








ASSESSMENT AND PLAN:

## 2020-07-26 NOTE — PN
Progress Note (short form)





- Note


Progress Note: 





NEUROSURGERY CONSULT DICTATED





Chart reviewed


History obtained


H/o HTN HLD CAD s/p stents c/o headache x 7 days over retro-orbital region.  

constant. no associated n/v . no focal weakness. No speech or vision changes.  

Fell 10 days ago, when slipped and hit his head on closet. No LOC, N/V, visual 

changes, weakness, seizure, no other complaints. Balance good


PE: AF, VSS


General- unremarkable


CN- intact; Motor- 5/5 withotu drift; Sensation- intact LT; DTR- physiological; 

Cerebellar- mild LFTN dysmetria


Head CT (prelim) : tentorial SDH with on mass effect or shift, no HCP


Head CT f/u (prelim): similar findings


Stable traumatic subacute L tentorial SDH


No neurosurgical intervention indicated


Hold ASA/plavix x 1 week


Repeat CT prior to resuming ASA/plavix


Smoking cessation urged


Short pulse of steroids may help H/A


Cardiology input

## 2020-07-27 NOTE — CONS
DATE OF CONSULTATION:  

 

REQUESTING PHYSICIAN:  Dr. Dennis

 

CONSULTING PHYSICIAN:  Ralf De Leon MD, 

 

CHIEF COMPLAINT:  Left tentorial subdural hematoma.

 

HISTORY OF PRESENT ILLNESS:  The patient is a 60-year-old right-handed male  with

history of hypertension, coronary artery disease, and hypercholesterolemia, who was

status post coronary stent placement, who complains of 7 days of headache in the

retroorbital region on the left side.  The patient said it was constant with no

associated nausea, vomiting, weakness, speech or visual changes.  He had fallen 10

days ago, mechanically, after he tripped, and he hit his head on a closet.  He denies

loss of consciousness at the time.  His balance has been good since.  He has no

weakness, numbness, or other new complaints.  

 

PAST MEDICAL HISTORY:  Significant of coronary artery disease, status post stent

placement, hypertension, hypercholesterolemia, and smoking.

 

MEDICATION:  Medication previously included aspirin and Plavix.  He is also on Coreg

and Prinivil and Crestor. 

 

There is no known drug allergy.

 

Family history is noncontributory.

 

SOCIAL HISTORY:  He does still smoke and is urged strongly to quit smoking.  He

drinks alcohol socially.  He lives at home.

 

Review of systems is otherwise negative for major constitutional, head and neck,

cardiovascular, pulmonary, gastrointestinal, genitourinary, endocrinological,

neurological, or psychological problem except for the above.

 

PHYSICAL EXAMINATION:

Vital Signs:  He is afebrile.  Vital signs are stable.

HEENT:  Exam shows him to be normocephalic, atraumatic, anicteric.

Neck:  Supple with no lymphadenopathy, no carotid bruit.

Coronary:  Regular rhythm. 

Lungs:  Clear.

Abdomen:  Benign.

Extremities:  No signs of DVT.

Neurologic:  He is awake and alert, oriented x4.  Cranial nerves examination is

intact, 2-12.  Motor examination shows 5/5 strength.  He has no drift.  Sensory

examination is intact to light touch.  Deep tendon reflexes are physiological. 

Cerebellar examination demonstrated mild left-sided dysmetria.

 

Laboratory examination shows a white blood cell count of 10.7, hemoglobin 14.1,

platelet count 204,000, INR is 1, and PTT is 31.7.  BUN 9.2 and creatinine 0.8. 

COVID-19 serology is pending.  

 

CT scan of the head from yesterday at 5 p.m. and last night at 10 p.m. were reviewed.

 There is evidence of a left tentorial subdural hematoma.  There is no significant

mass effect or shift.  There is no hydrocephalus.  The blood is mostly the medial

tentorium.  

 

IMPRESSION:  

1.  Probable traumatic left tentorial subdural hematoma. 

2.  Hypertension/coronary artery disease, status post stent placement.

3.  Hypercholesterolemia.

4.  Smoker.

 

RECOMMENDATION:  The patient presented with a traumatic mechanical fall 10 days ago

and complains of left retroorbital headache for 7 days duration with no associated

neurological deficit.  He has no nausea, vomiting, visual changes, or speech

difficulties.  The blood will likely resolve on its own.  This is a not a typical

location for an AVM or aneurysm bleed.  He has a clear history of trauma.  The

patient is already scheduled for transfer to [facility] from the

emergency room earlier, but he is currently still awaiting a bed.  Further evaluation

and treatment could be conducted by treating neurology/neurosurgery and cardiology

team at [facility].  Aspirin and Plavix should be held for 1 week.  CT scan of the head

should be repeated in 1 week prior to reinitiation of the aspirin and Plavix regimen.

 

 

 

RALF DE LEON M.D.

 

SHANTEL/[id number]

DD: 07/26/2020 08:53

DT: 07/27/2020 14:44

Job #:  [id number]